# Patient Record
Sex: MALE | URBAN - METROPOLITAN AREA
[De-identification: names, ages, dates, MRNs, and addresses within clinical notes are randomized per-mention and may not be internally consistent; named-entity substitution may affect disease eponyms.]

---

## 2019-10-17 ENCOUNTER — NURSE TRIAGE (OUTPATIENT)
Dept: NURSING | Facility: CLINIC | Age: 29
End: 2019-10-17

## 2019-10-17 NOTE — TELEPHONE ENCOUNTER
Reason for Disposition    SEVERE abdominal pain (e.g., excruciating)    Additional Information    Negative: Passed out (i.e., fainted, collapsed and was not responding)    Negative: Shock suspected (e.g., cold/pale/clammy skin, too weak to stand, low BP, rapid pulse)    Negative: Sounds like a life-threatening emergency to the triager    Negative: Chest pain    Negative: Pain is mainly in upper abdomen (if needed ask: 'is it mainly above the belly button?')    Protocols used: ABDOMINAL PAIN - MALE-A-OH    Patient's girlfriend calling on his behalf.  Patient was able to give verbal consent to communicate with writer.  Per patient's significant other, the patient was diagnosed with diverticulitis.  She reports that he is experiencing severe abdominal pain, that is uncontrolled with prescribed medications.  She reports that the patient is taking his antibiotics as prescribed.  Writer discussed pain management techniques with caller and discussed plan for medication administration.  Writer recommended that patient return to the ED per guideline due to severe abdominal pain.  However, the patient does not have insurance, so at this time they will wait to see if his symptoms improve.  If symptoms do not improve, patient will return to [facility] ED.    Kandy Quintero RN  [facility]